# Patient Record
Sex: FEMALE
[De-identification: names, ages, dates, MRNs, and addresses within clinical notes are randomized per-mention and may not be internally consistent; named-entity substitution may affect disease eponyms.]

---

## 2020-10-13 ENCOUNTER — HOSPITAL ENCOUNTER (EMERGENCY)
Dept: HOSPITAL 43 - DL.ED | Age: 60
Discharge: HOME | End: 2020-10-13
Payer: COMMERCIAL

## 2020-10-13 DIAGNOSIS — E78.00: ICD-10-CM

## 2020-10-13 DIAGNOSIS — R42: Primary | ICD-10-CM

## 2020-10-13 DIAGNOSIS — E03.9: ICD-10-CM

## 2020-10-13 DIAGNOSIS — Z79.899: ICD-10-CM

## 2020-10-13 LAB
ANION GAP SERPL CALC-SCNC: 13.8 MEQ/L (ref 7–13)
CHLORIDE SERPL-SCNC: 104 MMOL/L (ref 98–107)
SODIUM SERPL-SCNC: 142 MMOL/L (ref 136–145)

## 2020-10-13 NOTE — EDM.PDOC
ED HPI GENERAL MEDICAL PROBLEM





- General


Chief Complaint: Neuro Symptoms/Deficits


Stated Complaint: LIGHT HEADED, SHORT OF BREATH, SHAKEY


Time Seen by Provider: 10/13/20 20:40


Source of Information: Reports: Patient


History Limitations: Reports: No Limitations





- History of Present Illness


INITIAL COMMENTS - FREE TEXT/NARRATIVE: 





ED with c/o of dizzy spell at home while sitting at home playing on phone, worse

when looked up and tried to get out of chair. Did not fall, has had milder 

symptoms when blood sugars low. Ate approximately 30minutes prior. lasted only a

couple minutes then resolved and no further sx. no smoker. Caffeine 3-4 cups per

day of coffee and soda. no weakness. No fever, No COVID exposure known. No 

urinary sx. No chest pain, Hx heart murmer. Recent echo and told it was stable. 


Treatments PTA: Reports: Other (see below)


Other Treatments PTA: none





- Related Data


                                    Allergies











Allergy/AdvReac Type Severity Reaction Status Date / Time


 


No Known Allergies Allergy   Verified 10/13/20 21:08











Home Meds: 


                                    Home Meds





Levothyroxine [Synthroid] 50 mcg PO ACBREAKFAST 10/13/20 [History]


atorvaSTATin [Lipitor] 20 mg PO DAILY 10/13/20 [History]











Past Medical History


HEENT History: Reports: Impaired Vision


Other HEENT History: wears corrective lenses


Cardiovascular History: Reports: Heart Murmur, High Cholesterol


Respiratory History: Reports: None


Gastrointestinal History: Reports: None


Genitourinary History: Reports: None


Neurological History: Reports: None


Endocrine/Metabolic History: Reports: Hypothyroidism, Other (See Below)


Other Endocrine/Metabolic History: hypoglycemia.  Hashimoto's





Social & Family History





- Family History


Family Medical History: Noncontributory





- Tobacco Use


Tobacco Use Status *Q: Unknown Ever Used Tobacco





- Caffeine Use


Caffeine Use: Reports: Coffee, Soda





- Recreational Drug Use


Recreational Drug Use: No





ED ROS GENERAL





- Review of Systems


Review Of Systems: Comprehensive ROS is negative, except as noted in HPI.





ED EXAM, NEURO





- Physical Exam


Exam: See Below


Exam Limited By: No Limitations


General Appearance: Alert, No Apparent Distress, Anxious


Eye Exam: Bilateral Eye: EOMI


Ears: Normal External Exam, Normal Canal, Normal TMs


Nose: Normal Inspection, Normal Mucosa


Throat/Mouth: Normal Inspection, Normal Lips, Normal Voice, No Airway Compromise


Head Exam: Atraumatic, Normocephalic


Neck: Normal Inspection, Full Range of Motion


Respiratory/Chest: No Respiratory Distress, Lungs Clear, Normal Breath Sounds


Cardiovascular: Normal Peripheral Pulses, Regular Rate, Rhythm, No Edema


GI/Abdominal: Normal Bowel Sounds, Soft


Neurological: Alert, Normal Mood/Affect, No Motor/Sensory Deficits, Oriented x 3


Extremities: Normal Inspection.  No: Pedal Edema


Psychiatric: Anxious


Skin Exam: Warm, Dry, Intact, Normal Color





Course





- Vital Signs


Last Recorded V/S: 


                                Last Vital Signs











Temp  98.1 F   10/13/20 20:37


 


Pulse  92   10/13/20 20:37


 


Resp  16   10/13/20 20:37


 


BP  169/99 H  10/13/20 20:37


 


Pulse Ox  98   10/13/20 20:37








                                        





Orthostatic Blood Pressure [     150/90


Standing]                        


Orthostatic Blood Pressure [     141/99


Sitting]                         


Orthostatic Blood Pressure [     147/88


Supine]                          











- Orders/Labs/Meds


Labs: 


                                Laboratory Tests











  10/13/20 10/13/20 10/13/20 Range/Units





  20:47 20:58 20:58 


 


WBC   6.8   (5.0-10.0)  10^3/uL


 


RBC   4.62   (4.2-5.4)  10^6/uL


 


Hgb   13.9   (12.0-16.0)  g/dL


 


Hct   40.7   (37.0-47.0)  %


 


MCV   88.1   ()  fL


 


MCH   30.1   (27.0-34.0)  pg


 


MCHC   34.2   (33.0-35.0)  g/dL


 


Plt Count   226   (150-450)  10^3/uL


 


Neut % (Auto)   58.6   (42.2-75.2)  %


 


Lymph % (Auto)   33.7   (20.5-50.1)  %


 


Mono % (Auto)   6.4   (2-8)  %


 


Eos % (Auto)   1.2   (1.0-3.0)  %


 


Baso % (Auto)   0.1   (0.0-1.0)  %


 


Sodium    142  (136-145)  mmol/L


 


Potassium    3.8  (3.5-5.1)  mmol/L


 


Chloride    104  ()  mmol/L


 


Carbon Dioxide    28  (21-32)  mmol/L


 


Anion Gap    13.8 H  (7-13)  mEq/L


 


BUN    20 H  (7-18)  mg/dL


 


Creatinine    0.74  (0.55-1.02)  mg/dL


 


Est Cr Clr Drug Dosing    62.34  mL/min


 


Estimated GFR (MDRD)    > 60  


 


BUN/Creatinine Ratio    27.0  (No establ ref range)  


 


Glucose    114 H  (74-99)  mg/dL


 


POC Glucose  128 H    ()  mg/dl


 


Calcium    9.4  (8.5-10.1)  mg/dL


 


Total Bilirubin    0.2  (0.2-1.0)  mg/dL


 


AST    16  (15-37)  U/L


 


ALT    35  (14-59)  U/L


 


Alkaline Phosphatase    78  ()  U/L


 


Troponin I    < 0.017  (0.000-0.056)  ng/mL


 


Total Protein    7.4  (6.4-8.2)  g/dL


 


Albumin    3.8  (3.4-5.0)  g/dL


 


Globulin    3.6  


 


Albumin/Globulin Ratio    1.1  














Departure





- Departure


Time of Disposition: 21:50


Disposition: Home, Self-Care 01


Condition: Good


Clinical Impression: 


 Dizziness of unknown etiology








- Discharge Information


*PRESCRIPTION DRUG MONITORING PROGRAM REVIEWED*: No


*COPY OF PRESCRIPTION DRUG MONITORING REPORT IN PATIENT MAYA: No


Instructions:  Dizziness, Easy-to-Read


Referrals: 


Tianna Wood NP [Primary Care Provider] - 


Forms:  ED Department Discharge


Additional Instructions: 


Change positions slowly


stay hydrated. continue to decrease caffeine intake


follow up with primary care


urgent follow up if worsening symptoms,  weakness 





Sepsis Event Note (ED)





- Evaluation


Sepsis Screening Result: No Definite Risk





- Focused Exam


Vital Signs: 


                                   Vital Signs











  Temp Pulse Resp BP Pulse Ox


 


 10/13/20 20:37  98.1 F  92  16  169/99 H  98

## 2021-06-12 ENCOUNTER — HOSPITAL ENCOUNTER (EMERGENCY)
Dept: HOSPITAL 43 - DL.ED | Age: 61
Discharge: HOME | End: 2021-06-12
Payer: COMMERCIAL
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
## 2021-06-12 DIAGNOSIS — E78.00: ICD-10-CM

## 2021-06-12 DIAGNOSIS — Z79.899: ICD-10-CM

## 2021-06-12 DIAGNOSIS — E03.9: ICD-10-CM

## 2021-06-12 DIAGNOSIS — S82.832A: Primary | ICD-10-CM

## 2021-06-12 NOTE — CR
PROCEDURE INFORMATION: 

Exam: XR Left Ankle 

Exam date and time: 6/12/2021 4:07 PM 

Age: 61 years old 

Clinical indication: Other: Fell in hole/pain; Additional info: Left ankle 

injury 



TECHNIQUE: 

Imaging protocol: XR Left ankle. 

Views: 3 or more views. 



COMPARISON: 

No relevant prior studies available. 



FINDINGS: 

Bones/joints: Small, minimally displaced avulsion fracture at the inferior tip 

the lateral malleolus. No dislocation. Normal bone mineralization. Joint spaces 

are maintained. Ankle mortise is symmetric. No joint effusion. 

Soft tissues: Marked soft tissue swelling over the lateral malleolus. No 

radiopaque foreign body. 



IMPRESSION: 

1. Small, minimally displaced avulsion fracture at the inferior tip the lateral 

malleolus. 

2. Marked soft tissue swelling over the lateral malleolus.

## 2021-06-12 NOTE — EDM.PDOC
Scribed by Yahaira Pate 06/12/21 [id number] for Silviano Estrada MD





ED HPI GENERAL MEDICAL PROBLEM





- General


Chief Complaint: Lower Extremity Injury/Pain


Stated Complaint: POSSIBLE BROKEN ANKLE


Time Seen by Provider: 06/12/21 15:55


Source of Information: Reports: Patient, RN, RN Notes Reviewed


History Limitations: Reports: No Limitations





- History of Present Illness


INITIAL COMMENTS - FREE TEXT/NARRATIVE: 


Patient presents to ED by POV stating that she stepped in a hole with her left 

ankle twisting it. She heard a loud crack. No other injury. Left ankle is now to

o painful to bear wt on.


Onset: Today


Duration: Getting Worse


Location: Reports: Lower Extremity, Left


Quality: Reports: Ache


Severity: Severe


Improves with: Reports: None


Worsens with: Reports: None


Associated Symptoms: Reports: No Other Symptoms


  ** Left Ankle


Pain Score (Numeric/FACES): 6





- Related Data


                                    Allergies











Allergy/AdvReac Type Severity Reaction Status Date / Time


 


No Known Allergies Allergy   Verified 06/12/21 16:03











Home Meds: 


                                    Home Meds





Levothyroxine [Synthroid] 50 mcg PO ACBREAKFAST 10/13/20 [History]


atorvaSTATin [Lipitor] 20 mg PO DAILY 10/13/20 [History]


Sertraline HCl 50 mg PO DAILY 06/12/21 [History]











Past Medical History


HEENT History: Reports: Impaired Vision


Other HEENT History: wears corrective lenses


Cardiovascular History: Reports: Heart Murmur, High Cholesterol


Respiratory History: Reports: None


Gastrointestinal History: Reports: None


Genitourinary History: Reports: None


Neurological History: Reports: None


Endocrine/Metabolic History: Reports: Hypothyroidism, Other (See Below)


Other Endocrine/Metabolic History: hypoglycemia.  Hashimoto's





Social & Family History





- Family History


Family Medical History: No Pertinent Family History





- Caffeine Use


Caffeine Use: Reports: Coffee, Soda





Review of Systems





- Review of Systems


Review Of Systems: Comprehensive ROS is negative, except as noted in HPI.





ED EXAM, GENERAL





- Physical Exam


Exam: See Below


Exam Limited By: No Limitations


General Appearance: Alert, WD/WN, No Apparent Distress


Throat/Mouth: Normal Voice, No Airway Compromise


Head: Atraumatic, Normocephalic


Neck: Normal Inspection, Full Range of Motion


Respiratory/Chest: No Respiratory Distress


Cardiovascular: Regular Rate, Rhythm


Extremities: No Pedal Edema, Joint Swelling (Left lateral ankle), Limited Range 

of Motion (Left ankle due to pain).  No: Increased Warmth, Mottled, Pallor, 

Redness


Neurological: Alert, Oriented


Psychiatric: Normal Mood


Skin Exam: Warm, Dry, Intact, Normal Color, No Rash





ED TRAUMA EXTREMITY PROCEDURES





- Splinting


  ** Left Lower Extremity


Splint Site: Left Ankle


Pre-Procedure NV Status: Normal


Post-Procedure NV Status: Normal


Splint Material: Boot Orthotic


Splint Design: Other (Boot)


Applied & Form Fitted By: Nurse


Provider Post-Splint Application NV Check: NV Status Normal, Good Position


Complications: No





Course





- Vital Signs


Last Recorded V/S: 


                                Last Vital Signs











Temp  98.3 F   06/12/21 16:04


 


Pulse  88   06/12/21 16:04


 


Resp  18   06/12/21 16:04


 


BP  172/93 H  06/12/21 16:04


 


Pulse Ox  96   06/12/21 16:04














- Orders/Labs/Meds


Orders: 


                               Active Orders 24 hr











 Category Date Time Status


 


 Ankle Min 3V Lt [CR] Stat Exams  06/12/21 15:58 Taken














- Radiology Interpretation


Free Text/Narrative:: 





XR Left Ankle: distal fibula avulsion fracture, see Rad. report.





- Re-Assessments/Exams


Free Text/Narrative Re-Assessment/Exam: 





06/12/21


Pt declined pain medication.





Departure





- Departure


Time of Disposition: 16:28


Disposition: Home, Self-Care 01


Condition: Good


Clinical Impression: 


Closed avulsion fracture of distal end of left fibula


Qualifiers:


 Encounter type: initial encounter Qualified Code(s): S82.832A - Other fracture 

of upper and lower end of left fibula, initial encounter for closed fracture








- Discharge Information


*PRESCRIPTION DRUG MONITORING PROGRAM REVIEWED*: Not Applicable


*COPY OF PRESCRIPTION DRUG MONITORING REPORT IN PATIENT MAYA: Not Applicable


Instructions:  Nondisplaced Fibular Ankle Fracture Treated With Immobilization, 

Adult, Crutch Use, Adult, Easy-to-Read


Forms:  ED Department Discharge


Additional Instructions: 


Use left ankle boot splint and crutches to minimize weight bearing on left foot 

until clear to bear weight by the podiatrist.


Rest, ice pack, and elevate left foot to reduce pain and swelling.


On Monday, June 14th, call [phone] to schedule a podiatry appointment with 

Dr. Isma Hoyt.





Sepsis Event Note (ED)





- Focused Exam


Vital Signs: 


                                   Vital Signs











  Temp Pulse Resp BP Pulse Ox


 


 06/12/21 16:04  98.3 F  88  18  172/93 H  96














- My Orders


Last 24 Hours: 


My Active Orders





06/12/21 15:58


Ankle Min 3V Lt [CR] Stat 














- Assessment/Plan


Last 24 Hours: 


My Active Orders





06/12/21 15:58


Ankle Min 3V Lt [CR] Stat 














I have read and agree with the documentation that has been completed regarding 

this visit. By signing this record, I attest that the documentation was 

completed in my physical presence and is an accurate record of the encounter.